# Patient Record
Sex: FEMALE | Race: WHITE
[De-identification: names, ages, dates, MRNs, and addresses within clinical notes are randomized per-mention and may not be internally consistent; named-entity substitution may affect disease eponyms.]

---

## 2018-04-03 ENCOUNTER — HOSPITAL ENCOUNTER (OUTPATIENT)
Dept: HOSPITAL 62 - RAD | Age: 48
End: 2018-04-03
Attending: FAMILY MEDICINE
Payer: COMMERCIAL

## 2018-04-03 DIAGNOSIS — M51.36: ICD-10-CM

## 2018-04-03 DIAGNOSIS — Z79.899: ICD-10-CM

## 2018-04-03 DIAGNOSIS — E66.3: ICD-10-CM

## 2018-04-03 DIAGNOSIS — M41.84: ICD-10-CM

## 2018-04-03 DIAGNOSIS — R53.83: ICD-10-CM

## 2018-04-03 DIAGNOSIS — E78.5: ICD-10-CM

## 2018-04-03 DIAGNOSIS — R10.11: ICD-10-CM

## 2018-04-03 DIAGNOSIS — M54.2: Primary | ICD-10-CM

## 2018-04-03 DIAGNOSIS — M54.9: ICD-10-CM

## 2018-04-03 DIAGNOSIS — K76.0: ICD-10-CM

## 2018-04-03 LAB
%HYPO/RBC NFR BLD AUTO: SLIGHT %
ABSOLUTE LYMPHOCYTES# (MANUAL): 1.9 10^3/UL (ref 0.5–4.7)
ABSOLUTE MONOCYTES # (MANUAL): 0.3 10^3/UL (ref 0.1–1.4)
ABSOLUTE NEUTROPHILS# (MANUAL): 4.4 10^3/UL (ref 1.7–8.2)
ADD MANUAL DIFF: YES
ALT SERPL-CCNC: 31 U/L (ref 9–52)
ANION GAP SERPL CALC-SCNC: 10 MMOL/L (ref 5–19)
AST SERPL-CCNC: 19 U/L (ref 14–36)
BASOPHILS NFR BLD MANUAL: 0 % (ref 0–2)
BUN SERPL-MCNC: 11 MG/DL (ref 7–20)
CALCIUM: 10.5 MG/DL (ref 8.4–10.2)
CHLORIDE SERPL-SCNC: 102 MMOL/L (ref 98–107)
CO2 SERPL-SCNC: 30 MMOL/L (ref 22–30)
EOSINOPHIL NFR BLD MANUAL: 3 % (ref 0–6)
ERYTHROCYTE [DISTWIDTH] IN BLOOD BY AUTOMATED COUNT: 12.5 % (ref 11.5–14)
GLUCOSE SERPL-MCNC: 89 MG/DL (ref 75–110)
HCT VFR BLD CALC: 42.3 % (ref 36–47)
HGB BLD-MCNC: 14.7 G/DL (ref 12–15.5)
LDLC SERPL DIRECT ASSAY-MCNC: 145 MG/DL (ref ?–100)
MCH RBC QN AUTO: 30.1 PG (ref 27–33.4)
MCHC RBC AUTO-ENTMCNC: 34.7 G/DL (ref 32–36)
MCV RBC AUTO: 87 FL (ref 80–97)
MONOCYTES % (MANUAL): 5 % (ref 3–13)
PLATELET # BLD: 282 10^3/UL (ref 150–450)
PLATELET COMMENT: ADEQUATE
POLYCHROMASIA BLD QL SMEAR: SLIGHT
POTASSIUM SERPL-SCNC: 4.3 MMOL/L (ref 3.6–5)
RBC # BLD AUTO: 4.88 10^6/UL (ref 3.72–5.28)
SEGMENTED NEUTROPHILS % (MAN): 64 % (ref 42–78)
SODIUM SERPL-SCNC: 142.4 MMOL/L (ref 137–145)
TOTAL CELLS COUNTED BLD: 100
TRIGL SERPL-MCNC: 292 MG/DL (ref ?–150)
VARIANT LYMPHS NFR BLD MANUAL: 26 % (ref 13–45)
VLDLC SERPL CALC-MCNC: 58.4 MG/DL (ref 10–31)
WBC # BLD AUTO: 6.9 10^3/UL (ref 4–10.5)

## 2018-04-03 PROCEDURE — 72220 X-RAY EXAM SACRUM TAILBONE: CPT

## 2018-04-03 PROCEDURE — 36415 COLL VENOUS BLD VENIPUNCTURE: CPT

## 2018-04-03 PROCEDURE — 86255 FLUORESCENT ANTIBODY SCREEN: CPT

## 2018-04-03 PROCEDURE — 86038 ANTINUCLEAR ANTIBODIES: CPT

## 2018-04-03 PROCEDURE — 84443 ASSAY THYROID STIM HORMONE: CPT

## 2018-04-03 PROCEDURE — 76705 ECHO EXAM OF ABDOMEN: CPT

## 2018-04-03 PROCEDURE — 80061 LIPID PANEL: CPT

## 2018-04-03 PROCEDURE — 85025 COMPLETE CBC W/AUTO DIFF WBC: CPT

## 2018-04-03 PROCEDURE — 86430 RHEUMATOID FACTOR TEST QUAL: CPT

## 2018-04-03 PROCEDURE — 80048 BASIC METABOLIC PNL TOTAL CA: CPT

## 2018-04-03 PROCEDURE — 72050 X-RAY EXAM NECK SPINE 4/5VWS: CPT

## 2018-04-03 PROCEDURE — 86225 DNA ANTIBODY NATIVE: CPT

## 2018-04-03 PROCEDURE — 72070 X-RAY EXAM THORAC SPINE 2VWS: CPT

## 2018-04-03 PROCEDURE — 84450 TRANSFERASE (AST) (SGOT): CPT

## 2018-04-03 PROCEDURE — 84460 ALANINE AMINO (ALT) (SGPT): CPT

## 2018-04-03 PROCEDURE — 86235 NUCLEAR ANTIGEN ANTIBODY: CPT

## 2018-04-03 PROCEDURE — 72110 X-RAY EXAM L-2 SPINE 4/>VWS: CPT

## 2018-04-03 NOTE — RADIOLOGY REPORT (SQ)
EXAM DESCRIPTION:  L SPINE WHOLE



COMPLETED DATE/TIME:  4/3/2018 1:20 pm



REASON FOR STUDY:  NECK PAIN,BACK PAIN,THORACIC SCOLIOSIS R10.11  RIGHT UPPER QUADRANT PAIN M54.2  CE
RVICALGIA M54.9  DORSALGIA, UNSPECIFIED



COMPARISON:  None.



NUMBER OF VIEWS:  Five views including obliques.



TECHNIQUE:  AP, lateral, oblique, and sacral radiographic images acquired of the lumbar spine.



LIMITATIONS:  None.



FINDINGS:  MINERALIZATION: Normal.

SEGMENTATION: Normal.  No transitional anatomy.

ALIGNMENT: Normal.

VERTEBRAE: Maintained height.  No fracture or worrisome bone lesion.

DISCS: Preserved height.  No significant osteophytes or end plate irregularity.

POSTERIOR ELEMENTS: Pedicles and facets are intact.  No pars defect or posterior arch defects.

HARDWARE: None in the spine.

PARASPINAL SOFT TISSUES: Normal.

PELVIS: Intact as visualized. No fractures or worrisome bone lesions. SI joints intact.

OTHER: No other significant finding.



IMPRESSION:  NORMAL 5 VIEW LUMBAR SPINE.



TECHNICAL DOCUMENTATION:  JOB ID:  [id number]

 2011 [employer]- All Rights Reserved



Reading location - IP/workstation name: [email or web address]

## 2018-04-03 NOTE — RADIOLOGY REPORT (SQ)
EXAM DESCRIPTION:  U/S ABDOMEN LIMITED W/O DOP



COMPLETED DATE/TIME:  4/3/2018 12:21 pm



REASON FOR STUDY:  RUQ PAIN R10.11  RIGHT UPPER QUADRANT PAIN M54.2  CERVICALGIA M54.9  DORSALGIA, UN
SPECIFIED



COMPARISON:  None.



TECHNIQUE:  Dynamic and static grayscale images acquired of the abdomen and recorded on PACS. Additio
nal selected color Doppler and spectral images recorded.



LIMITATIONS:  None.



FINDINGS:  PANCREAS: Poorly seen.  The head is normal.

LIVER: 15.4 cm.  Increased echogenicity.

LIVER VASCULATURE: Normal directional flow of the main portal vein and hepatic veins.

GALLBLADDER: No stones. Normal wall thickness. No pericholecystic fluid.

ULTRASOUND-DETECTED GONSALEZ'S SIGN: Negative.

INTRAHEPATIC DUCTS AND COMMON DUCT: CBD and intrahepatic ducts normal caliber. No filling defects.

INFERIOR VENA CAVA: Not well seen.

AORTA: No aneurysm.

RIGHT KIDNEY:  Normal size, 10.9 cm. Normal echogenicity. No solid or suspicious masses. No hydroneph
rosis. No calcifications.

PERITONEAL AND RIGHT PLEURAL SPACE: No ascites or effusions.

OTHER: No other significant findings.



IMPRESSION:  Fatty infiltration of the liver.



TECHNICAL DOCUMENTATION:  JOB ID:  [id number]

 2011 [employer]- All Rights Reserved



Reading location - IP/workstation name: [email or web address]

## 2018-04-03 NOTE — RADIOLOGY REPORT (SQ)
EXAM DESCRIPTION:  SACRUM AND COCCYX



COMPLETED DATE/TIME:  4/3/2018 1:20 pm



REASON FOR STUDY:  NECK PAIN,BACK PAIN,THORACIC SCOLIOSIS R10.11  RIGHT UPPER QUADRANT PAIN M54.2  CE
RVICALGIA M54.9  DORSALGIA, UNSPECIFIED



COMPARISON:  None.



NUMBER OF VIEWS:  Two views



TECHNIQUE:  AP,  and tilt views of the sacrum and coccyx.



LIMITATIONS:  None.



FINDINGS:  MINERALIZATION: Normal.

BONES: No acute fracture or dislocation.  No worrisome bone lesions.

SOFT TISSUES: No soft tissue swelling.  No foreign body.

OTHER: No other significant finding.



IMPRESSION:  NEGATIVE STUDY OF THE SACRUM AND COCCYX.



TECHNICAL DOCUMENTATION:  JOB ID:  [id number]

 2011 [employer]- All Rights Reserved



Reading location - IP/workstation name: [email or web address]

## 2018-04-03 NOTE — RADIOLOGY REPORT (SQ)
EXAM DESCRIPTION:  CERV SP 4 OR 5 VIEWS



COMPLETED DATE/TIME:  4/3/2018 1:20 pm



REASON FOR STUDY:  NECK PAIN,BACK PAIN,THORACIC SCOLIOSIS R10.11  RIGHT UPPER QUADRANT PAIN M54.2  CE
RVICALGIA M54.9  DORSALGIA, UNSPECIFIED



COMPARISON:  None.



NUMBER OF VIEWS:  Five views.



TECHNIQUE:  AP, lateral, obliques and odontoid radiographic images acquired of the cervical spine.



LIMITATIONS:  None.



FINDINGS:  MINERALIZATION: Normal.

ALIGNMENT: Anatomic.

VERTEBRAE: Vertebral bodies of normal height.

DISCS: No significant osteophytes or sclerosis. Disc height maintained.

FORAMINA: No osteophytes or foraminal narrowing.

LATERAL AND POSTERIOR ELEMENTS: Facets, lateral masses and spinous processes without significant find
ings.

HARDWARE: None in the spine.

SOFT TISSUES: No masses or calcifications. Lung apices clear.

OTHER: No other significant finding.



IMPRESSION:  NO SIGNIFICANT RADIOGRAPHIC FINDING IN THE CERVICAL SPINE.



TECHNICAL DOCUMENTATION:  JOB ID:  [id number]

 2011 [id number]- All Rights Reserved



Reading location - IP/workstation name: [email or web address]

## 2018-04-03 NOTE — RADIOLOGY REPORT (SQ)
EXAM DESCRIPTION:  T SPINE AP/LAT



COMPLETED DATE/TIME:  4/3/2018 1:20 pm



REASON FOR STUDY:  NECK PAIN,BACK PAIN,THORACIC SCOLIOSIS R10.11  RIGHT UPPER QUADRANT PAIN M54.2  CE
RVICALGIA M54.9  DORSALGIA, UNSPECIFIED



COMPARISON:  None.



NUMBER OF VIEWS:  Two views.



TECHNIQUE:  AP and lateral radiographic images acquired of the thoracic spine.



LIMITATIONS:  None.



FINDINGS:  MINERALIZATION: Normal.

ALIGNMENT: Normal.  No scoliosis.

VERTEBRAE: No fracture or bone lesion.  Maintained height, normal segmentation.

DISCS: Scattered osteophytes.  No focal dominant finding.

HARDWARE: None in the spine.

MEDIASTINUM AND SOFT TISSUES: Normal heart size and aortic contour.  No soft tissue abnormality.

VISUALIZED LUNG FIELDS: Clear.

OTHER: No other significant finding.



IMPRESSION:  Mild degenerative disc disease.



TECHNICAL DOCUMENTATION:  JOB ID:  [id number]

 2011 [employer]- All Rights Reserved



Reading location - IP/workstation name: [email or web address]

## 2018-04-05 ENCOUNTER — HOSPITAL ENCOUNTER (OUTPATIENT)
Dept: HOSPITAL 62 - LAB | Age: 48
End: 2018-04-05
Attending: FAMILY MEDICINE
Payer: COMMERCIAL

## 2018-04-05 DIAGNOSIS — Z79.899: ICD-10-CM

## 2018-04-05 DIAGNOSIS — E83.52: Primary | ICD-10-CM

## 2018-04-05 LAB — HISTONE ANTIBODY: 0.6 UNITS (ref 0–0.9)

## 2018-04-05 PROCEDURE — 36415 COLL VENOUS BLD VENIPUNCTURE: CPT

## 2018-04-05 PROCEDURE — 83970 ASSAY OF PARATHORMONE: CPT

## 2019-03-20 ENCOUNTER — HOSPITAL ENCOUNTER (OUTPATIENT)
Dept: HOSPITAL 62 - OD | Age: 49
End: 2019-03-20
Attending: FAMILY MEDICINE
Payer: COMMERCIAL

## 2019-03-20 DIAGNOSIS — Z79.899: ICD-10-CM

## 2019-03-20 DIAGNOSIS — E83.52: Primary | ICD-10-CM

## 2019-03-20 DIAGNOSIS — K21.9: ICD-10-CM

## 2019-03-20 LAB
ALBUMIN SERPL-MCNC: 4.5 G/DL (ref 3.5–5)
ALP SERPL-CCNC: 65 U/L (ref 38–126)
ALT SERPL-CCNC: 23 U/L (ref 9–52)
ANION GAP SERPL CALC-SCNC: 10 MMOL/L (ref 5–19)
AST SERPL-CCNC: 16 U/L (ref 14–36)
BILIRUB DIRECT SERPL-MCNC: 0.2 MG/DL (ref 0–0.4)
BILIRUB SERPL-MCNC: 1.1 MG/DL (ref 0.2–1.3)
BUN SERPL-MCNC: 12 MG/DL (ref 7–20)
CALCIUM: 10.4 MG/DL (ref 8.4–10.2)
CHLORIDE SERPL-SCNC: 101 MMOL/L (ref 98–107)
CO2 SERPL-SCNC: 30 MMOL/L (ref 22–30)
GLUCOSE SERPL-MCNC: 87 MG/DL (ref 75–110)
POTASSIUM SERPL-SCNC: 4.8 MMOL/L (ref 3.6–5)
PROT SERPL-MCNC: 7.6 G/DL (ref 6.3–8.2)
SODIUM SERPL-SCNC: 141.1 MMOL/L (ref 137–145)

## 2019-03-20 PROCEDURE — 36415 COLL VENOUS BLD VENIPUNCTURE: CPT

## 2019-03-20 PROCEDURE — 80053 COMPREHEN METABOLIC PANEL: CPT
